# Patient Record
Sex: FEMALE | Race: BLACK OR AFRICAN AMERICAN | URBAN - METROPOLITAN AREA
[De-identification: names, ages, dates, MRNs, and addresses within clinical notes are randomized per-mention and may not be internally consistent; named-entity substitution may affect disease eponyms.]

---

## 2022-06-14 ENCOUNTER — HOSPITAL ENCOUNTER (OUTPATIENT)
Dept: GENERAL RADIOLOGY | Age: 67
Discharge: HOME | End: 2022-06-14
Payer: COMMERCIAL

## 2022-06-14 DIAGNOSIS — S83.91XA SPRAIN OF RIGHT KNEE, UNSPECIFIED LIGAMENT, INITIAL ENCOUNTER: ICD-10-CM

## 2022-06-14 PROCEDURE — 73590 X-RAY EXAM OF LOWER LEG: CPT

## 2022-06-14 PROCEDURE — 73562 X-RAY EXAM OF KNEE 3: CPT

## 2022-12-18 ENCOUNTER — APPOINTMENT (OUTPATIENT)
Dept: GENERAL RADIOLOGY | Age: 67
End: 2022-12-18
Payer: COMMERCIAL

## 2022-12-18 ENCOUNTER — HOSPITAL ENCOUNTER (EMERGENCY)
Age: 67
Discharge: HOME | End: 2022-12-18
Attending: STUDENT IN AN ORGANIZED HEALTH CARE EDUCATION/TRAINING PROGRAM
Payer: COMMERCIAL

## 2022-12-18 VITALS
SYSTOLIC BLOOD PRESSURE: 122 MMHG | RESPIRATION RATE: 17 BRPM | OXYGEN SATURATION: 97 % | HEART RATE: 102 BPM | WEIGHT: 207.01 LBS | DIASTOLIC BLOOD PRESSURE: 60 MMHG | TEMPERATURE: 98 F | BODY MASS INDEX: 36.68 KG/M2 | HEIGHT: 63 IN

## 2022-12-18 DIAGNOSIS — H61.23 BILATERAL IMPACTED CERUMEN: Primary | ICD-10-CM

## 2022-12-18 DIAGNOSIS — H61.23 IMPACTED CERUMEN OF BOTH EARS: ICD-10-CM

## 2022-12-18 DIAGNOSIS — J06.9 VIRAL UPPER RESPIRATORY TRACT INFECTION: ICD-10-CM

## 2022-12-18 LAB
RAPID INFLUENZA  B AGN: NEGATIVE
RAPID INFLUENZA A AGN: NEGATIVE
SARS-COV-2, NAAT: NOT DETECTED

## 2022-12-18 PROCEDURE — 87804 INFLUENZA ASSAY W/OPTIC: CPT

## 2022-12-18 PROCEDURE — 69209 REMOVE IMPACTED EAR WAX UNI: CPT

## 2022-12-18 PROCEDURE — 71046 X-RAY EXAM CHEST 2 VIEWS: CPT

## 2022-12-18 PROCEDURE — 87635 SARS-COV-2 COVID-19 AMP PRB: CPT

## 2022-12-18 PROCEDURE — 99284 EMERGENCY DEPT VISIT MOD MDM: CPT

## 2022-12-18 RX ORDER — CARBAMIDE PEROXIDE 6.5% 6.5 G/100ML
5 LIQUID AURICULAR (OTIC) 2 TIMES DAILY
Qty: 15 ML | Refills: 1 | Status: SHIPPED | OUTPATIENT
Start: 2022-12-18 | End: 2023-01-17

## 2022-12-18 RX ORDER — CIPROFLOXACIN AND DEXAMETHASONE 3; 1 MG/ML; MG/ML
4 SUSPENSION/ DROPS AURICULAR (OTIC) 2 TIMES DAILY
Qty: 7.5 ML | Refills: 0 | Status: SHIPPED | OUTPATIENT
Start: 2022-12-18 | End: 2022-12-28

## 2022-12-18 ASSESSMENT — ENCOUNTER SYMPTOMS
SHORTNESS OF BREATH: 0
RHINORRHEA: 1
ALLERGIC/IMMUNOLOGIC NEGATIVE: 1
GASTROINTESTINAL NEGATIVE: 1
SINUS PAIN: 0
COUGH: 1
TROUBLE SWALLOWING: 1
EYE REDNESS: 1
SINUS PRESSURE: 0
SORE THROAT: 1

## 2022-12-18 ASSESSMENT — PAIN SCALES - GENERAL: PAINLEVEL_OUTOF10: 3

## 2022-12-18 ASSESSMENT — PAIN - FUNCTIONAL ASSESSMENT: PAIN_FUNCTIONAL_ASSESSMENT: 0-10

## 2022-12-18 ASSESSMENT — PAIN DESCRIPTION - DESCRIPTORS: DESCRIPTORS: ACHING

## 2022-12-18 ASSESSMENT — PAIN DESCRIPTION - FREQUENCY: FREQUENCY: CONTINUOUS

## 2022-12-18 ASSESSMENT — PAIN DESCRIPTION - LOCATION: LOCATION: HEAD

## 2022-12-18 ASSESSMENT — PAIN DESCRIPTION - PAIN TYPE: TYPE: ACUTE PAIN

## 2022-12-19 NOTE — ED PROVIDER NOTES
ED Attending Attestation Note     Date of evaluation: 12/18/2022    This patient was seen by the resident. I have seen and examined the patient, agree with the workup, evaluation, management and diagnosis. The care plan has been discussed. My assessment reveals a 71-year-old female complaining of ear fullness and diminished hearing for at least several weeks. Her daughter was able to remove a small amount of wax at home, and she has been using over-the-counter wax softening drops, however she is almost completely unable to hear out of her right ear. She has also had sinus congestion, rhinorrhea, and a sore throat for the past 2 days. On exam, she had impacted cerumen bilaterally. This was removed as described in the procedure note below, after which patient reported significant improvement in her hearing. There was minor trauma to her right EAC during the procedure with a small abrasion visible, and she was started on Ciprodex drops prophylactically at discharge. She was also tested for flu and COVID given her URI symptoms, and these were both negative. She was advised to follow-up with her PCP for further care. Ear Wax Removal    Date/Time: 12/18/2022 10:05 PM  Performed by: Flora Marrero MD  Authorized by: Flora Marrero MD     Consent:     Consent obtained:  Verbal    Consent given by:  Patient    Risks discussed:  Infection, incomplete removal, pain, TM perforation and bleeding    Alternatives discussed:  No treatment  Universal protocol:     Procedure explained and questions answered to patient or proxy's satisfaction: yes      Patient identity confirmed:  Verbally with patient  Procedure details:     Location:  L ear and R ear    Procedure type: curette      Procedure outcomes: cerumen removed    Post-procedure details:      Inspection:  Some cerumen remaining and bleeding (Small abrasion in right EAC)    Hearing quality:  Improved    Procedure completion:  Cathryne Litten Sam Esparza MD  12/18/22 1203

## 2022-12-19 NOTE — ED PROVIDER NOTES
[address] [facility] RESIDENT NOTE       Date of evaluation: 12/18/2022    Chief Complaint     Cough, Otalgia, and Headache (Patient states cough, congestion, bilateral ear pain, headache x1 week. )      History of Present Illness     Grace Shankar is a 78 y.o. female who presents chief complaint of bilateral ear pain with left being worse than the right is been present for about a week. Patient relates that her daughter is a nurse and looked inside her ear and noticed something black and was able to remove it. Patient relates continued pain and pressure and feels that her ears are clogged especially in the morning but that improves marginally throughout the day. Patient denies any fever, chills, nausea, vomiting however she does note new onset cough and sore throat that been present for the past 2 days. Patient admits to headache secondary to ear discomfort. Patient denies any use of Q-tips and that she never uses them. Patient has taken Tylenol at home but has not taken any today for symptom control. Patient denies any sick contacts in the recent weeks. Review of Systems     Review of Systems   Constitutional:  Negative for chills and fever. HENT:  Positive for congestion, ear discharge, ear pain, postnasal drip, rhinorrhea, sore throat and trouble swallowing. Negative for sinus pressure and sinus pain. Eyes:  Positive for redness. Respiratory:  Positive for cough. Negative for shortness of breath. Cardiovascular: Negative. Gastrointestinal: Negative. Endocrine: Negative. Genitourinary: Negative. Musculoskeletal: Negative. Skin: Negative. Allergic/Immunologic: Negative. Neurological:  Positive for headaches. Hematological: Negative. Psychiatric/Behavioral: Negative.        Past Medical, Surgical, Family, and Social History     She has a past medical history of BPPV (benign paroxysmal positional vertigo), Hyperlipidemia, and Hypertension. She has a past surgical history that includes Gallbladder surgery. Her family history is not on file. She reports that she quit smoking about 16 years ago. She does not have any smokeless tobacco history on file. She reports that she does not drink alcohol and does not use drugs. Medications     Previous Medications    AMLODIPINE (NORVASC) 10 MG TABLET    Take 10 mg by mouth    ASPIRIN (ASPIRIN CHILDRENS) 81 MG CHEWABLE TABLET    Take 1 tablet by mouth daily. ATORVASTATIN (LIPITOR) 40 MG TABLET    Take 40 mg by mouth daily    CHOLECALCIFEROL (VITAMIN D) 2000 UNITS CAPS CAPSULE    Take by mouth    IBUPROFEN (ADVIL;MOTRIN) 600 MG TABLET    Take 1 tablet by mouth every 6 hours as needed for Pain    NAPROXEN (NAPROSYN) 500 MG TABLET    Take 1 tablet by mouth 2 times daily (with meals)    RANITIDINE (ZANTAC) 150 MG TABLET    Take 150 mg by mouth       Allergies     She has No Known Allergies. Physical Exam     INITIAL VITALS: BP: (!) 176/102, Temp: 98 °F (36.7 °C), Heart Rate: (!) 119, Resp: 17, SpO2: 97 %   Physical Exam  Constitutional:       Appearance: She is ill-appearing. HENT:      Head: Normocephalic. Right Ear: Ear canal and external ear normal. There is impacted cerumen. Left Ear: Ear canal and external ear normal. There is impacted cerumen. Nose: Congestion and rhinorrhea present. Cardiovascular:      Rate and Rhythm: Regular rhythm. Tachycardia present. Pulses: Normal pulses. Heart sounds: Normal heart sounds. Pulmonary:      Effort: Pulmonary effort is normal.      Breath sounds: Normal breath sounds. Abdominal:      General: Abdomen is flat. Palpations: Abdomen is soft. Musculoskeletal:         General: Normal range of motion. Cervical back: Normal range of motion and neck supple. Skin:     General: Skin is warm and dry. Neurological:      General: No focal deficit present.       Mental Status: She is alert and oriented to person, place, and time. DiagnosticResults       RADIOLOGY:  XR CHEST (2 VW)   Final Result      1. No findings for acute cardiopulmonary disease. LABS:   Results for orders placed or performed during the hospital encounter of 12/18/22   Rapid Flu Swab    Specimen: Nasopharyngeal   Result Value Ref Range    Rapid Influenza A Ag Negative Negative    Rapid Influenza B Ag Negative Negative   COVID-19, Rapid    Specimen: Nasopharyngeal Swab   Result Value Ref Range    SARS-CoV-2, NAAT Not Detected Not Detected       ED BEDSIDE ULTRASOUND:  Not warranted    RECENT VITALS:  BP: (!) 176/102, Temp: 98 °F (36.7 °C), Heart Rate: (!) 102,Resp: 17, SpO2: 97 %     Procedures     Removal of impacted cerumen:  Consent:     Consent obtained:  Verbal    Consent given by:  Patient    Risks discussed:  Infection, incomplete removal, pain, TM perforation and bleeding    Alternatives discussed:  No treatment  Universal protocol:     Procedure explained and questions answered to patient or proxy's satisfaction: yes      Patient identity confirmed:  Verbally with patient  Procedure details:     Location:  L ear and R ear    Procedure type: curette      Procedure outcomes: cerumen removed    Post-procedure details: Inspection:  Some cerumen remaining and bleeding (Small abrasion in right EAC)    Hearing quality:  Improved    Procedure completion:  Tolerated    ED Course     Nursing Notes, Past Medical Hx, Past Surgical Hx, Social Hx, Allergies, and Family Hx were reviewed.     The patient was given the following medications:  Orders Placed This Encounter   Medications    ciprofloxacin-dexamethasone (CIPRODEX) 0.3-0.1 % otic suspension     Sig: Place 4 drops in ear(s) 2 times daily for 10 days     Dispense:  7.5 mL     Refill:  0    carbamide peroxide (EARWAX TREATMENT DROPS) 6.5 % otic solution     Sig: Place 5 drops into both ears 2 times daily     Dispense:  15 mL     Refill:  1         CONSULTS:  None    MEDICAL DECISION MAKING / ASSESSMENT / Marcello Dupont is a 78 y.o. female who presented to the ED today for otalgia, cough, sore throat present for a week with cough and sore throat present for the past 2 days. No known sick contacts. Upon arrival patient was afebrile, tachycardic and hypertensive but all other vital signs stable. Upon physical examination erythema noted to the bilateral sclera, pharyngitis with erythema, no exudate present. Ear canal without erythema, swelling, fluid, presence of foreign body or drainage noted. Congestion present with rhinorrhea. Nasal swab with rapid COVID and rapid flu for labs are unremarkable, including negative for both. Impacted cerumen was removed as noted above with improvement in hearing immediately. Imaging chest x-ray was obtained that showed: no acute findings      On re-evaluation, the patient was noted to have continued congestion but with improved hearing. Minor trauma to the right EAC during procedure  . At this time, the most likely diagnosis is impacted cerumen b/l along with a non-specified upper respiratory infection. I do not believe that this patient requires any further work-up or inpatient management for their complaints, and are appropriate for outpatient follow-up. I discussed the findings of my physical exam and work-up with the patient, and they were given the opportunity to ask questions. The patient is agreeable with the plan and is ready to be discharged. The patient was discharged  with prescriptions for Carbamide peroxide and Ciprodex. Patient instructed to follow-up with her pcp as soon as possible, and given strict return precautions. This patient was also evaluated by the attending physician. All care plans were discussed and agreed upon. Clinical Impression     1. Bilateral impacted cerumen    2. Viral upper respiratory tract infection    3.  Impacted cerumen of both ears        Disposition     PATIENT REFERRED TO:  No follow-up provider specified.     DISCHARGE MEDICATIONS:  New Prescriptions    CARBAMIDE PEROXIDE (EARWAX TREATMENT DROPS) 6.5 % OTIC SOLUTION    Place 5 drops into both ears 2 times daily    CIPROFLOXACIN-DEXAMETHASONE (CIPRODEX) 0.3-0.1 % OTIC SUSPENSION    Place 4 drops in ear(s) 2 times daily for 10 days       DISPOSITION Decision To Discharge 12/18/2022 10:06:47 PM       Elvia Alpers, DPM  Resident  12/18/22 2931